# Patient Record
Sex: FEMALE | Race: WHITE | NOT HISPANIC OR LATINO
[De-identification: names, ages, dates, MRNs, and addresses within clinical notes are randomized per-mention and may not be internally consistent; named-entity substitution may affect disease eponyms.]

---

## 2023-11-10 ENCOUNTER — APPOINTMENT (OUTPATIENT)
Dept: ANTEPARTUM | Facility: CLINIC | Age: 33
End: 2023-11-10
Payer: COMMERCIAL

## 2023-11-10 ENCOUNTER — ASOB RESULT (OUTPATIENT)
Age: 33
End: 2023-11-10

## 2023-11-10 PROBLEM — Z00.00 ENCOUNTER FOR PREVENTIVE HEALTH EXAMINATION: Status: ACTIVE | Noted: 2023-11-10

## 2023-11-10 PROCEDURE — 76813 OB US NUCHAL MEAS 1 GEST: CPT

## 2023-11-10 PROCEDURE — 76801 OB US < 14 WKS SINGLE FETUS: CPT | Mod: 59

## 2023-11-24 ENCOUNTER — APPOINTMENT (OUTPATIENT)
Dept: MATERNAL FETAL MEDICINE | Facility: CLINIC | Age: 33
End: 2023-11-24
Payer: COMMERCIAL

## 2023-11-24 ENCOUNTER — ASOB RESULT (OUTPATIENT)
Age: 33
End: 2023-11-24

## 2023-11-24 PROCEDURE — 99203 OFFICE O/P NEW LOW 30 MIN: CPT | Mod: 95

## 2023-12-06 ENCOUNTER — ASOB RESULT (OUTPATIENT)
Age: 33
End: 2023-12-06

## 2023-12-06 ENCOUNTER — APPOINTMENT (OUTPATIENT)
Dept: ANTEPARTUM | Facility: CLINIC | Age: 33
End: 2023-12-06
Payer: COMMERCIAL

## 2023-12-06 PROCEDURE — 76805 OB US >/= 14 WKS SNGL FETUS: CPT

## 2023-12-28 ENCOUNTER — ASOB RESULT (OUTPATIENT)
Age: 33
End: 2023-12-28

## 2023-12-28 ENCOUNTER — APPOINTMENT (OUTPATIENT)
Dept: ANTEPARTUM | Facility: CLINIC | Age: 33
End: 2023-12-28
Payer: COMMERCIAL

## 2023-12-28 PROCEDURE — 76816 OB US FOLLOW-UP PER FETUS: CPT

## 2024-01-28 ENCOUNTER — EMERGENCY (EMERGENCY)
Facility: HOSPITAL | Age: 34
LOS: 0 days | Discharge: HOME | End: 2024-01-28
Attending: EMERGENCY MEDICINE
Payer: COMMERCIAL

## 2024-01-28 ENCOUNTER — OUTPATIENT (OUTPATIENT)
Dept: INPATIENT UNIT | Facility: HOSPITAL | Age: 34
LOS: 1 days | Discharge: HOME | End: 2024-01-28
Payer: COMMERCIAL

## 2024-01-28 ENCOUNTER — RESULT REVIEW (OUTPATIENT)
Age: 34
End: 2024-01-28

## 2024-01-28 VITALS
SYSTOLIC BLOOD PRESSURE: 101 MMHG | TEMPERATURE: 99 F | OXYGEN SATURATION: 97 % | RESPIRATION RATE: 19 BRPM | DIASTOLIC BLOOD PRESSURE: 66 MMHG

## 2024-01-28 VITALS
DIASTOLIC BLOOD PRESSURE: 67 MMHG | SYSTOLIC BLOOD PRESSURE: 117 MMHG | OXYGEN SATURATION: 100 % | TEMPERATURE: 98 F | HEART RATE: 97 BPM | RESPIRATION RATE: 16 BRPM

## 2024-01-28 DIAGNOSIS — O26.899 OTHER SPECIFIED PREGNANCY RELATED CONDITIONS, UNSPECIFIED TRIMESTER: ICD-10-CM

## 2024-01-28 DIAGNOSIS — M25.562 PAIN IN LEFT KNEE: ICD-10-CM
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
## 2024-01-28 DIAGNOSIS — O26.892 OTHER SPECIFIED PREGNANCY RELATED CONDITIONS, SECOND TRIMESTER: ICD-10-CM
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
## 2024-01-28 DIAGNOSIS — Z3A.24 24 WEEKS GESTATION OF PREGNANCY: ICD-10-CM
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
## 2024-01-28 LAB
ABO RH CONFIRMATION: SIGNIFICANT CHANGE UP
APTT BLD: 29.5 SEC — SIGNIFICANT CHANGE UP (ref 24.5–35.6)
BASOPHILS # BLD AUTO: 0.04 K/UL — SIGNIFICANT CHANGE UP (ref 0–0.2)
BASOPHILS NFR BLD AUTO: 0.4 % — SIGNIFICANT CHANGE UP (ref 0–2)
EOSINOPHIL # BLD AUTO: 0.18 K/UL — SIGNIFICANT CHANGE UP (ref 0–0.5)
EOSINOPHIL NFR BLD AUTO: 1.7 % — SIGNIFICANT CHANGE UP (ref 0–6)
FIBRINOGEN PPP-MCNC: 459 MG/DL — HIGH (ref 200–435)
HCT VFR BLD CALC: 35.1 % — SIGNIFICANT CHANGE UP (ref 34.5–45)
HGB BLD-MCNC: 11.6 G/DL — SIGNIFICANT CHANGE UP (ref 11.5–15.5)
IMM GRANULOCYTES NFR BLD AUTO: 0.4 % — SIGNIFICANT CHANGE UP (ref 0–0.9)
INR BLD: 0.89 RATIO — SIGNIFICANT CHANGE UP (ref 0.85–1.18)
LYMPHOCYTES # BLD AUTO: 1.74 K/UL — SIGNIFICANT CHANGE UP (ref 1–3.3)
LYMPHOCYTES # BLD AUTO: 16.9 % — SIGNIFICANT CHANGE UP (ref 13–44)
MCHC RBC-ENTMCNC: 29.3 PG — SIGNIFICANT CHANGE UP (ref 27–34)
MCHC RBC-ENTMCNC: 33 GM/DL — SIGNIFICANT CHANGE UP (ref 32–36)
MCV RBC AUTO: 88.6 FL — SIGNIFICANT CHANGE UP (ref 80–100)
MONOCYTES # BLD AUTO: 0.65 K/UL — SIGNIFICANT CHANGE UP (ref 0–0.9)
MONOCYTES NFR BLD AUTO: 6.3 % — SIGNIFICANT CHANGE UP (ref 2–14)
NEUTROPHILS # BLD AUTO: 7.65 K/UL — HIGH (ref 1.8–7.4)
NEUTROPHILS NFR BLD AUTO: 74.3 % — SIGNIFICANT CHANGE UP (ref 43–77)
PLATELET # BLD AUTO: 208 K/UL — SIGNIFICANT CHANGE UP (ref 150–400)
PROTHROM AB SERPL-ACNC: 10.1 SEC — SIGNIFICANT CHANGE UP (ref 9.5–13)
RBC # BLD: 3.96 M/UL — SIGNIFICANT CHANGE UP (ref 3.8–5.2)
RBC # FLD: 13.7 % — SIGNIFICANT CHANGE UP (ref 10.3–14.5)
WBC # BLD: 10.3 K/UL — SIGNIFICANT CHANGE UP (ref 3.8–10.5)
WBC # FLD AUTO: 10.3 K/UL — SIGNIFICANT CHANGE UP (ref 3.8–10.5)

## 2024-01-28 PROCEDURE — 86850 RBC ANTIBODY SCREEN: CPT

## 2024-01-28 PROCEDURE — 76815 OB US LIMITED FETUS(S): CPT

## 2024-01-28 PROCEDURE — 99283 EMERGENCY DEPT VISIT LOW MDM: CPT

## 2024-01-28 PROCEDURE — 36415 COLL VENOUS BLD VENIPUNCTURE: CPT

## 2024-01-28 PROCEDURE — 86901 BLOOD TYPING SEROLOGIC RH(D): CPT

## 2024-01-28 PROCEDURE — 99282 EMERGENCY DEPT VISIT SF MDM: CPT

## 2024-01-28 PROCEDURE — 85384 FIBRINOGEN ACTIVITY: CPT

## 2024-01-28 PROCEDURE — 99214 OFFICE O/P EST MOD 30 MIN: CPT

## 2024-01-28 PROCEDURE — 85025 COMPLETE CBC W/AUTO DIFF WBC: CPT

## 2024-01-28 PROCEDURE — 99212 OFFICE O/P EST SF 10 MIN: CPT

## 2024-01-28 PROCEDURE — 85730 THROMBOPLASTIN TIME PARTIAL: CPT

## 2024-01-28 PROCEDURE — 76815 OB US LIMITED FETUS(S): CPT | Mod: 26

## 2024-01-28 PROCEDURE — 86900 BLOOD TYPING SEROLOGIC ABO: CPT

## 2024-01-28 PROCEDURE — 85610 PROTHROMBIN TIME: CPT

## 2024-01-28 NOTE — ED STATDOCS - CLINICAL SUMMARY MEDICAL DECISION MAKING FREE TEXT BOX
In summary this 33-year-old female who presents status post fall, she is 24 weeks pregnant.  Patient reported that she had left knee pain prior to arrival.  On exam she has no reproducible tenderness, is ambulatory, limb is neurovascular intact.  She has no other signs of trauma on her body.  No need for further evaluation in the ED.  Discussed with L&D patient to be discharged to L&D for NST.

## 2024-01-28 NOTE — OB PROVIDER TRIAGE NOTE - ATTENDING COMMENTS
34yo  @ 24w 1d presented to labor and delivery after fall on  lower stairs @ 3.30 pm today, positive fetal movements, no abdominal pain or tenderness, no vaginal bleeding or leakage of fluids.   US wnl  FHR appropriate for gestational age.  discussed following up with primary OB in 24- 48 hrs, To nearest labor room if symptomatic.  Instructions, note to work given, pelvic rest advised.  Resident note reviewed and agreed

## 2024-01-28 NOTE — ED STATDOCS - NSFOLLOWUPINSTRUCTIONS_ED_ALL_ED_FT
Proceed to Labor and Delivery Unit.     Follow up with OBGYN once discharged. Take Tylenol 650-1000 mg every 6 hours as needed for pain. Do not exceed 4,000 mg in a 24 hour period. Return to ED for new or worsening symptoms.    Acute Knee Pain, Adult  Acute knee pain is sudden and may be caused by damage, swelling, or irritation of the muscles and tissues that support the knee. Pain may result from:  A fall.  An injury to the knee from twisting motions.  A hit to the knee.  Infection.  Acute knee pain may go away on its own with time and rest. If it does not, your health care provider may order tests to find the cause of the pain. These may include:  Imaging tests, such as an X-ray, MRI, CT scan, or ultrasound.  Joint aspiration. In this test, fluid is removed from the knee and evaluated.  Arthroscopy. In this test, a lighted tube is inserted into the knee and an image is projected onto a TV screen.  Biopsy. In this test, a sample of tissue is removed from the body and studied under a microscope.  Follow these instructions at home:  If you have a knee sleeve or brace:      Wear the knee sleeve or brace as told by your health care provider. Remove it only as told by your health care provider.  Loosen it if your toes tingle, become numb, or turn cold and blue.  Keep it clean.  If the knee sleeve or brace is not waterproof:  Do not let it get wet.  Cover it with a watertight covering when you take a bath or shower.  Activity    Rest your knee.  Do not do things that cause pain or make pain worse.  Avoid high-impact activities or exercises, such as running, jumping rope, or doing jumping jacks.  Work with a physical therapist to make a safe exercise program, as recommended by your health care provider. Do exercises as told by your physical therapist.  Managing pain, stiffness, and swelling      If directed, put ice on the affected knee. To do this:  If you have a removable knee sleeve or brace, remove it as told by your health care provider.  Put ice in a plastic bag.  Place a towel between your skin and the bag.  Leave the ice on for 20 minutes, 2–3 times a day.  Remove the ice if your skin turns bright red. This is very important. If you cannot feel pain, heat, or cold, you have a greater risk of damage to the area.  If directed, use an elastic bandage to put pressure (compression) on your injured knee. This may control swelling, give support, and help with discomfort.  Raise (elevate) your knee above the level of your heart while you are sitting or lying down.  Sleep with a pillow under your knee.  General instructions    Take over-the-counter and prescription medicines only as told by your health care provider.  Do not use any products that contain nicotine or tobacco, such as cigarettes, e-cigarettes, and chewing tobacco. If you need help quitting, ask your health care provider.  If you are overweight, work with your health care provider and a dietitian to set a weight-loss goal that is healthy and reasonable for you. Extra weight can put pressure on your knee.  Pay attention to any changes in your symptoms.  Keep all follow-up visits. This is important.  Contact a health care provider if:  Your knee pain continues, changes, or gets worse.  You have a fever along with knee pain.  Your knee feels warm to the touch or is red.  Your knee jose alberto or locks up.  Get help right away if:  Your knee swells, and the swelling becomes worse.  You cannot move your knee.  You have severe pain in your knee that cannot be managed with pain medicine.  Summary  Acute knee pain can be caused by a fall, an injury, an infection, or damage, swelling, or irritation of the tissues that support your knee.  Your health care provider may perform tests to find out the cause of the pain.  Pay attention to any changes in your symptoms. Relieve your pain with rest, medicines, light activity, and the use of ice.  Get help right away if your knee swells, you cannot move your knee, or you have severe pain that cannot be managed with medicine.  This information is not intended to replace advice given to you by your health care provider. Make sure you discuss any questions you have with your health care provider.

## 2024-01-28 NOTE — OB PROVIDER TRIAGE NOTE - NSHPLABSRESULTS_GEN_ALL_CORE
< from: US OB Position Fetus(es) (01.28.24 @ 19:45) >  FINDINGS:    Single live Intrauterine gestation is present.  Fetal position: CEPHALIC presentation.  Placenta location: ANT . Normal in appearance.  Amniotic fluid volume: Normal.  Cervical length: Not well visualized.    Fetal motion is seen in real-time and the fetal heart rate measures 163   bpm bpm.    Fetal anatomy and umbilical cord were not evaluated.    Measurements are as follows:    BPD: 6.1 cm  corresponding to 24 weeks, 6 days.  HC: 22.4 cm corresponding to 24 weeks, 3 days.  AC: 20.1 cm corresponding to 24 weeks, 5 days.  FL: 4.5 cm corresponding to 24 weeks, 5 days.    Estimated fetal weight: 429 g,   1 lb and 10 ounces    Estimated fetal weight percent: 65%.    Additional comments: None.    IMPRESSION:  Single live intrauterine gestation.    Please note that this study was not optimized for the evaluation of fetal  anatomy and umbilical cord which should be performed on a separate basis   as clinically indicated.    --- End of Report ---    < end of copied text >

## 2024-01-28 NOTE — ED STATDOCS - OBJECTIVE STATEMENT
32 y/o female GP approximately 24 weeks pregnant presents to the ED s/p trip and fall landing on her right knee, pt with mild knee pain however able to ambulate without difficulty. Pt does not believe she hit her abdomen but was advised by her OB to come and be evaluate. Pt denies vaginal bleeding, abdominal pain, N/V, hematuria.

## 2024-01-28 NOTE — ED STATDOCS - PROGRESS NOTE DETAILS
33 year old female approx 24 weeks gestation presents to ED c/o left knee pain s/p mechanical fall. Parish head trauma or fall onto abdomen. Spoke to her OB, advised to come to ED for further evaluation. No abdominal cramping, vaginal bleeding, ext numbness/weakness/tingling. Vitals are stable on arrival. PE demonstrates gravid abdomen, no ttp, left knee no ttp, full ROM, NVI, steady gait. Spoke to L&D, recommends dc from ED and to send her up to L&D floor for NST. Pt aware and agrees to plan. - Ember Lora PA-C

## 2024-01-28 NOTE — ED ADULT TRIAGE NOTE - CHIEF COMPLAINT QUOTE
Pt is 24wks pregnant, fell down approx. 7 steps. States "I was walking down the stairs with my son, his foot got stuck in the railing I tripped over him a bit and hit my left knee and landed on the right side. I don't think I hit my stomach but when I told my doctor she told me to get checked out. I still feel my baby moving." Denies vaginal bleeding, abdominal pain, knee pain. L&D Luis called and states to register patient in ER and then they will evaluate patient.

## 2024-01-28 NOTE — OB PROVIDER TRIAGE NOTE - HISTORY OF PRESENT ILLNESS
HANNAH QUEZADA is a 32yo  @ 24w1d by LMP 23 JEY 24 presenting after fall down 6 stairs. Patient reports tripping over her son before falling down the steps, landing first onto her left knee then onto her right side. Denies direct abdominal strike. Denies abdominal cramping, vaginal bleeding, LOF. Reports active FM.    PNC   1. AMA, LR NIPs  2. Prior CSx1    OBhx:   G1:  pCS 2/2 arrest of dilation  Gyn: +fibroid uterus  PMH: denies  PSH: CSx1, T&A  Med: PNV, ASA  Allergies: NKDA

## 2024-01-28 NOTE — OB RN TRIAGE NOTE - CURRENT PREGNANCY COMPLICATIONS, OB PROFILE
Patient currently being seen in [facility]    Reason for Disposition  • Patient already left for the hospital/clinic.    Protocols used: NO CONTACT OR DUPLICATE CONTACT CALL-A-AH       None

## 2024-01-28 NOTE — OB PROVIDER TRIAGE NOTE - NSHPPHYSICALEXAM_GEN_ALL_CORE
Vitals:   T(C): 36.7 (01-28-24 @ 18:07), Max: 37.1 (01-28-24 @ 16:59)  T(F): 98 (01-28-24 @ 18:07), Max: 98.8 (01-28-24 @ 16:59)  HR: 97 (01-28-24 @ 18:07) (97 - 97)  BP: 117/67 (01-28-24 @ 18:07) (101/66 - 117/67)  RR: 16 (01-28-24 @ 18:07) (16 - 19)  SpO2: 100% (01-28-24 @ 18:07) (97% - 100%)    General: AAOx3, NAD  Abd: Soft, nontender, gravid  SVE: deferred    FHT: reactive  [place]:  not deepali

## 2024-01-28 NOTE — OB PROVIDER TRIAGE NOTE - NSOBPROVIDERNOTE_OBGYN_ALL_OB_FT
A/P: HANNAH QUEZADA is a 34yo  @ 24w1d by LMP 23 JEY 24 presenting after she fell onto knee and right side after fall down 6 stairs.     - (+) FM, denies CTXs, denies VB  - NST reactive and reassuring for gestational age  - US wnl with normal placenta  - To follow up outpatient with provider within the week  - Return precautions given    D/w Dr. Hawthorne

## 2024-01-28 NOTE — ED STATDOCS - PATIENT PORTAL LINK FT
You can access the FollowMyHealth Patient Portal offered by [facility] by registering at the following website: [email or web address]. By joining [employer]’s FollowMyHealth portal, you will also be able to view your health information using other applications (apps) compatible with our system.

## 2024-01-31 DIAGNOSIS — O34.219 MATERNAL CARE FOR UNSPECIFIED TYPE SCAR FROM PREVIOUS CESAREAN DELIVERY: ICD-10-CM

## 2024-01-31 DIAGNOSIS — Z3A.24 24 WEEKS GESTATION OF PREGNANCY: ICD-10-CM

## 2024-01-31 DIAGNOSIS — O09.522 SUPERVISION OF ELDERLY MULTIGRAVIDA, SECOND TRIMESTER: ICD-10-CM

## 2024-01-31 DIAGNOSIS — Z04.3 ENCOUNTER FOR EXAMINATION AND OBSERVATION FOLLOWING OTHER ACCIDENT: ICD-10-CM

## 2024-01-31 DIAGNOSIS — D25.9 LEIOMYOMA OF UTERUS, UNSPECIFIED: ICD-10-CM

## 2024-01-31 DIAGNOSIS — O34.12 MATERNAL CARE FOR BENIGN TUMOR OF CORPUS UTERI, SECOND TRIMESTER: ICD-10-CM

## 2024-04-23 ENCOUNTER — NON-APPOINTMENT (OUTPATIENT)
Age: 34
End: 2024-04-23

## 2024-04-24 ENCOUNTER — NON-APPOINTMENT (OUTPATIENT)
Age: 34
End: 2024-04-24

## 2024-04-29 ENCOUNTER — APPOINTMENT (OUTPATIENT)
Dept: OBGYN | Facility: CLINIC | Age: 34
End: 2024-04-29

## 2024-05-09 ENCOUNTER — OUTPATIENT (OUTPATIENT)
Dept: OUTPATIENT SERVICES | Facility: HOSPITAL | Age: 34
LOS: 1 days | End: 2024-05-09

## 2024-05-09 VITALS
HEART RATE: 71 BPM | HEIGHT: 62 IN | DIASTOLIC BLOOD PRESSURE: 72 MMHG | SYSTOLIC BLOOD PRESSURE: 106 MMHG | OXYGEN SATURATION: 99 % | TEMPERATURE: 98 F | RESPIRATION RATE: 18 BRPM | WEIGHT: 220.9 LBS

## 2024-05-09 DIAGNOSIS — Z98.891 HISTORY OF UTERINE SCAR FROM PREVIOUS SURGERY: Chronic | ICD-10-CM

## 2024-05-09 LAB
APPEARANCE UR: ABNORMAL
BACTERIA # UR AUTO: ABNORMAL /HPF
BILIRUB UR-MCNC: NEGATIVE — SIGNIFICANT CHANGE UP
BLD GP AB SCN SERPL QL: NEGATIVE — SIGNIFICANT CHANGE UP
CAST: 0 /LPF — SIGNIFICANT CHANGE UP (ref 0–4)
COLOR SPEC: YELLOW — SIGNIFICANT CHANGE UP
DIFF PNL FLD: NEGATIVE — SIGNIFICANT CHANGE UP
GLUCOSE UR QL: 100 MG/DL
HCT VFR BLD CALC: 36.3 % — SIGNIFICANT CHANGE UP (ref 34.5–45)
HGB BLD-MCNC: 11.8 G/DL — SIGNIFICANT CHANGE UP (ref 11.5–15.5)
KETONES UR-MCNC: NEGATIVE MG/DL — SIGNIFICANT CHANGE UP
LEUKOCYTE ESTERASE UR-ACNC: NEGATIVE — SIGNIFICANT CHANGE UP
MCHC RBC-ENTMCNC: 28.7 PG — SIGNIFICANT CHANGE UP (ref 27–34)
MCHC RBC-ENTMCNC: 32.5 GM/DL — SIGNIFICANT CHANGE UP (ref 32–36)
MCV RBC AUTO: 88.3 FL — SIGNIFICANT CHANGE UP (ref 80–100)
NITRITE UR-MCNC: NEGATIVE — SIGNIFICANT CHANGE UP
NRBC # BLD: 0 /100 WBCS — SIGNIFICANT CHANGE UP (ref 0–0)
NRBC # FLD: 0 K/UL — SIGNIFICANT CHANGE UP (ref 0–0)
PH UR: 6 — SIGNIFICANT CHANGE UP (ref 5–8)
PLATELET # BLD AUTO: 176 K/UL — SIGNIFICANT CHANGE UP (ref 150–400)
PROT UR-MCNC: SIGNIFICANT CHANGE UP MG/DL
RBC # BLD: 4.11 M/UL — SIGNIFICANT CHANGE UP (ref 3.8–5.2)
RBC # FLD: 14.6 % — HIGH (ref 10.3–14.5)
RBC CASTS # UR COMP ASSIST: 0 /HPF — SIGNIFICANT CHANGE UP (ref 0–4)
RH IG SCN BLD-IMP: POSITIVE — SIGNIFICANT CHANGE UP
SP GR SPEC: 1.03 — SIGNIFICANT CHANGE UP (ref 1–1.03)
SQUAMOUS # UR AUTO: 24 /HPF — HIGH (ref 0–5)
UROBILINOGEN FLD QL: 0.2 MG/DL — SIGNIFICANT CHANGE UP (ref 0.2–1)
WBC # BLD: 7.37 K/UL — SIGNIFICANT CHANGE UP (ref 3.8–10.5)
WBC # FLD AUTO: 7.37 K/UL — SIGNIFICANT CHANGE UP (ref 3.8–10.5)
WBC UR QL: 5 /HPF — SIGNIFICANT CHANGE UP (ref 0–5)

## 2024-05-09 NOTE — OB PST NOTE - IN ACCORDANCE WITH [place] STATE LAW, WE OFFER EVERY PATIENT A HEPATITIS C TEST. WOULD YOU LIKE TO BE TESTED TODAY?
Nurses Note -- 4 Eyes      10/6/2022   3:04 AM      Skin assessed during: Admit      [x] No Pressure Injuries Present    []Prevention Measures Documented      [] Yes- Altered Skin Integrity Present or Discovered   [] LDA Added if Not in Epic (Describe Wound)   [] New Altered Skin Integrity was Present on Admit and Documented in LDA   [] Wound Image Taken    Wound Care Consulted? No    Attending Nurse:  Satnam Sosa RN     Second RN/Staff Member:  Sienna Lai RN      Opt out

## 2024-05-09 NOTE — OB PST NOTE - PROBLEM SELECTOR PLAN 1
Patient is tentatively for  Section on 24. Pre-op intructions provided to patient. Patient given verbal and written instructions on Chlorhexadine soap. Patient verbalized understanding. Patient instructed to obtain all medication intructions from OB and able to verbalize understanding.    CBC/UA/T&S sent at PST

## 2024-05-09 NOTE — OB PST NOTE - HISTORY OF PRESENT ILLNESS
34 year old pregnant female presents to presurgical testing scheduled for repeat Caesarean section. Patient denies vaginal  bleeding, spotting or leakage of amniotic fluid. Patient denies regular contractions. Patient reports positive fetal movement. Patient scheduled for repeat  on 24.                                                            2cm left ovarian simple cyst  Abnormal NT BW & DS, declines Amnio  Placenta extending to STEVEN per Anatomy Sono WNL 24

## 2024-05-09 NOTE — OB PST NOTE - NSHPPHYSICALEXAM_GEN_ALL_CORE
Constitutional: well developed, well groomed, well nourished, no distress    Eyes: PERRL, EOMI, conjunctiva clear    Ears: normal    Mouth and Gums: normal, moist    Pharynx: no tenderness, discharge, or peritonsillar abscess    Tonsils: no redness, discharge, tenderness, or swelling    Neck: supple    Breast: normal shape    Back: normal shape, ROM intact, strength intact, no vertebral tenderness    Respiratory: airway patent, breast sounds equal, good air movement, respiration non-labored, clear to auscultation bilaterally    Cardiovascular: regular rate and rhythm, no rubs, murmur, normal PMI    Gastrointestinal: gravid abdomen, non tender, normal bowel sounds    Extremities: no clubbing, cyanosis     Vascular: carotid pulse normal, radial pulse normal, DP pulse normal, PT pulse normal    Neurological: alert and oriented x3, sensation intact, normal strength    Skin: warm and dry, normal color    Lymph nodes: no anterior cervical lymphadenopathy    Musculoskeletal: ROM intact, normal strength, no joint swelling, warmth, or calf tenderness    Psychiatric: normal affect, normal behavior

## 2024-05-13 ENCOUNTER — TRANSCRIPTION ENCOUNTER (OUTPATIENT)
Age: 34
End: 2024-05-13

## 2024-05-14 ENCOUNTER — INPATIENT (INPATIENT)
Facility: HOSPITAL | Age: 34
LOS: 1 days | Discharge: HOME | End: 2024-05-16
Attending: OBSTETRICS & GYNECOLOGY | Admitting: OBSTETRICS & GYNECOLOGY

## 2024-05-14 ENCOUNTER — TRANSCRIPTION ENCOUNTER (OUTPATIENT)
Age: 34
End: 2024-05-14

## 2024-05-14 VITALS — SYSTOLIC BLOOD PRESSURE: 99 MMHG | DIASTOLIC BLOOD PRESSURE: 62 MMHG | HEART RATE: 86 BPM | TEMPERATURE: 98 F

## 2024-05-14 DIAGNOSIS — Z98.891 HISTORY OF UTERINE SCAR FROM PREVIOUS SURGERY: Chronic | ICD-10-CM

## 2024-05-14 LAB
BASOPHILS # BLD AUTO: 0.06 K/UL — SIGNIFICANT CHANGE UP (ref 0–0.2)
BASOPHILS NFR BLD AUTO: 0.7 % — SIGNIFICANT CHANGE UP (ref 0–2)
BLD GP AB SCN SERPL QL: NEGATIVE — SIGNIFICANT CHANGE UP
EOSINOPHIL # BLD AUTO: 0.25 K/UL — SIGNIFICANT CHANGE UP (ref 0–0.5)
EOSINOPHIL NFR BLD AUTO: 3.1 % — SIGNIFICANT CHANGE UP (ref 0–6)
HCT VFR BLD CALC: 40.9 % — SIGNIFICANT CHANGE UP (ref 34.5–45)
HGB BLD-MCNC: 13.1 G/DL — SIGNIFICANT CHANGE UP (ref 11.5–15.5)
IANC: 5.09 K/UL — SIGNIFICANT CHANGE UP (ref 1.8–7.4)
IMM GRANULOCYTES NFR BLD AUTO: 0.4 % — SIGNIFICANT CHANGE UP (ref 0–0.9)
LYMPHOCYTES # BLD AUTO: 2.19 K/UL — SIGNIFICANT CHANGE UP (ref 1–3.3)
LYMPHOCYTES # BLD AUTO: 27.1 % — SIGNIFICANT CHANGE UP (ref 13–44)
MCHC RBC-ENTMCNC: 28.6 PG — SIGNIFICANT CHANGE UP (ref 27–34)
MCHC RBC-ENTMCNC: 32 GM/DL — SIGNIFICANT CHANGE UP (ref 32–36)
MCV RBC AUTO: 89.3 FL — SIGNIFICANT CHANGE UP (ref 80–100)
MONOCYTES # BLD AUTO: 0.45 K/UL — SIGNIFICANT CHANGE UP (ref 0–0.9)
MONOCYTES NFR BLD AUTO: 5.6 % — SIGNIFICANT CHANGE UP (ref 2–14)
NEUTROPHILS # BLD AUTO: 5.09 K/UL — SIGNIFICANT CHANGE UP (ref 1.8–7.4)
NEUTROPHILS NFR BLD AUTO: 63.1 % — SIGNIFICANT CHANGE UP (ref 43–77)
NRBC # BLD: 0 /100 WBCS — SIGNIFICANT CHANGE UP (ref 0–0)
NRBC # FLD: 0 K/UL — SIGNIFICANT CHANGE UP (ref 0–0)
PLATELET # BLD AUTO: 187 K/UL — SIGNIFICANT CHANGE UP (ref 150–400)
RBC # BLD: 4.58 M/UL — SIGNIFICANT CHANGE UP (ref 3.8–5.2)
RBC # FLD: 14.6 % — HIGH (ref 10.3–14.5)
RH IG SCN BLD-IMP: POSITIVE — SIGNIFICANT CHANGE UP
T PALLIDUM AB TITR SER: NEGATIVE — SIGNIFICANT CHANGE UP
WBC # BLD: 8.07 K/UL — SIGNIFICANT CHANGE UP (ref 3.8–10.5)
WBC # FLD AUTO: 8.07 K/UL — SIGNIFICANT CHANGE UP (ref 3.8–10.5)

## 2024-05-14 DEVICE — SURGICEL SNOW 2 X 4": Type: IMPLANTABLE DEVICE | Status: FUNCTIONAL

## 2024-05-14 RX ORDER — IBUPROFEN 200 MG
1 TABLET ORAL
Qty: 0 | Refills: 0 | DISCHARGE
Start: 2024-05-14

## 2024-05-14 RX ORDER — SODIUM CHLORIDE 9 MG/ML
1000 INJECTION, SOLUTION INTRAVENOUS
Refills: 0 | Status: DISCONTINUED | OUTPATIENT
Start: 2024-05-14 | End: 2024-05-14

## 2024-05-14 RX ORDER — SODIUM CHLORIDE 9 MG/ML
1000 INJECTION, SOLUTION INTRAVENOUS
Refills: 0 | Status: DISCONTINUED | OUTPATIENT
Start: 2024-05-14 | End: 2024-05-16

## 2024-05-14 RX ORDER — FAMOTIDINE 10 MG/ML
20 INJECTION INTRAVENOUS ONCE
Refills: 0 | Status: COMPLETED | OUTPATIENT
Start: 2024-05-14 | End: 2024-05-14

## 2024-05-14 RX ORDER — DIPHENHYDRAMINE HCL 50 MG
25 CAPSULE ORAL EVERY 6 HOURS
Refills: 0 | Status: DISCONTINUED | OUTPATIENT
Start: 2024-05-14 | End: 2024-05-16

## 2024-05-14 RX ORDER — ACETAMINOPHEN 500 MG
3 TABLET ORAL
Qty: 0 | Refills: 0 | DISCHARGE
Start: 2024-05-14

## 2024-05-14 RX ORDER — OXYCODONE HYDROCHLORIDE 5 MG/1
5 TABLET ORAL
Refills: 0 | Status: DISCONTINUED | OUTPATIENT
Start: 2024-05-14 | End: 2024-05-16

## 2024-05-14 RX ORDER — KETOROLAC TROMETHAMINE 30 MG/ML
30 SYRINGE (ML) INJECTION EVERY 6 HOURS
Refills: 0 | Status: DISCONTINUED | OUTPATIENT
Start: 2024-05-14 | End: 2024-05-15

## 2024-05-14 RX ORDER — MAGNESIUM HYDROXIDE 400 MG/1
30 TABLET, CHEWABLE ORAL
Refills: 0 | Status: DISCONTINUED | OUTPATIENT
Start: 2024-05-14 | End: 2024-05-16

## 2024-05-14 RX ORDER — LANOLIN
1 OINTMENT (GRAM) TOPICAL EVERY 6 HOURS
Refills: 0 | Status: DISCONTINUED | OUTPATIENT
Start: 2024-05-14 | End: 2024-05-16

## 2024-05-14 RX ORDER — ACETAMINOPHEN 500 MG
1000 TABLET ORAL ONCE
Refills: 0 | Status: COMPLETED | OUTPATIENT
Start: 2024-05-14 | End: 2024-05-14

## 2024-05-14 RX ORDER — ASPIRIN/CALCIUM CARB/MAGNESIUM 324 MG
2 TABLET ORAL
Refills: 0 | DISCHARGE

## 2024-05-14 RX ORDER — HEPARIN SODIUM 5000 [USP'U]/ML
10000 INJECTION INTRAVENOUS; SUBCUTANEOUS EVERY 12 HOURS
Refills: 0 | Status: DISCONTINUED | OUTPATIENT
Start: 2024-05-14 | End: 2024-05-16

## 2024-05-14 RX ORDER — OXYTOCIN 10 UNIT/ML
333.33 VIAL (ML) INJECTION
Qty: 20 | Refills: 0 | Status: DISCONTINUED | OUTPATIENT
Start: 2024-05-14 | End: 2024-05-16

## 2024-05-14 RX ORDER — TETANUS TOXOID, REDUCED DIPHTHERIA TOXOID AND ACELLULAR PERTUSSIS VACCINE, ADSORBED 5; 2.5; 8; 8; 2.5 [IU]/.5ML; [IU]/.5ML; UG/.5ML; UG/.5ML; UG/.5ML
0.5 SUSPENSION INTRAMUSCULAR ONCE
Refills: 0 | Status: DISCONTINUED | OUTPATIENT
Start: 2024-05-14 | End: 2024-05-16

## 2024-05-14 RX ORDER — OXYCODONE HYDROCHLORIDE 5 MG/1
5 TABLET ORAL ONCE
Refills: 0 | Status: DISCONTINUED | OUTPATIENT
Start: 2024-05-14 | End: 2024-05-16

## 2024-05-14 RX ORDER — IBUPROFEN 200 MG
600 TABLET ORAL EVERY 6 HOURS
Refills: 0 | Status: COMPLETED | OUTPATIENT
Start: 2024-05-14 | End: 2025-04-12

## 2024-05-14 RX ORDER — ACETAMINOPHEN 500 MG
975 TABLET ORAL
Refills: 0 | Status: DISCONTINUED | OUTPATIENT
Start: 2024-05-14 | End: 2024-05-16

## 2024-05-14 RX ORDER — CHLORHEXIDINE GLUCONATE 213 G/1000ML
1 SOLUTION TOPICAL DAILY
Refills: 0 | Status: DISCONTINUED | OUTPATIENT
Start: 2024-05-14 | End: 2024-05-14

## 2024-05-14 RX ORDER — SIMETHICONE 80 MG/1
80 TABLET, CHEWABLE ORAL EVERY 4 HOURS
Refills: 0 | Status: DISCONTINUED | OUTPATIENT
Start: 2024-05-14 | End: 2024-05-16

## 2024-05-14 RX ORDER — LANOLIN
1 OINTMENT (GRAM) TOPICAL
Qty: 0 | Refills: 0 | DISCHARGE
Start: 2024-05-14

## 2024-05-14 RX ORDER — CITRIC ACID/SODIUM CITRATE 300-500 MG
30 SOLUTION, ORAL ORAL ONCE
Refills: 0 | Status: COMPLETED | OUTPATIENT
Start: 2024-05-14 | End: 2024-05-14

## 2024-05-14 RX ADMIN — Medication 975 MILLIGRAM(S): at 21:53

## 2024-05-14 RX ADMIN — Medication 975 MILLIGRAM(S): at 21:23

## 2024-05-14 RX ADMIN — Medication 30 MILLILITER(S): at 09:16

## 2024-05-14 RX ADMIN — Medication 400 MILLIGRAM(S): at 14:56

## 2024-05-14 RX ADMIN — CHLORHEXIDINE GLUCONATE 1 APPLICATION(S): 213 SOLUTION TOPICAL at 09:16

## 2024-05-14 RX ADMIN — HEPARIN SODIUM 10000 UNIT(S): 5000 INJECTION INTRAVENOUS; SUBCUTANEOUS at 17:08

## 2024-05-14 RX ADMIN — Medication 30 MILLIGRAM(S): at 17:36

## 2024-05-14 RX ADMIN — FAMOTIDINE 20 MILLIGRAM(S): 10 INJECTION INTRAVENOUS at 09:24

## 2024-05-14 RX ADMIN — Medication 1000 MILLIUNIT(S)/MIN: at 14:57

## 2024-05-14 RX ADMIN — Medication 30 MILLIGRAM(S): at 17:08

## 2024-05-14 RX ADMIN — SODIUM CHLORIDE 200 MILLILITER(S): 9 INJECTION, SOLUTION INTRAVENOUS at 09:16

## 2024-05-14 RX ADMIN — SODIUM CHLORIDE 75 MILLILITER(S): 9 INJECTION, SOLUTION INTRAVENOUS at 14:57

## 2024-05-14 NOTE — OB PROVIDER H&P - NSHPPHYSICALEXAM_GEN_ALL_CORE
Physical Exam:  Vitals: ICU Vital Signs Last 24 Hrs  T(C): 36.9 (14 May 2024 09:32), Max: 36.9 (14 May 2024 09:32)  T(F): 98.4 (14 May 2024 09:32), Max: 98.4 (14 May 2024 09:32)  HR: 86 (14 May 2024 09:32) (86 - 86)  BP: 99/62 (14 May 2024 09:32) (99/62 - 99/62)  BP(mean): --  ABP: --  ABP(mean): --  RR: 15 (14 May 2024 09:32) (15 - 15)  SpO2: --      Gen: NAD, A+O x 3, resting comfortably  Resp: clear to ausculation bilaterally  Cardio: regular rate and rhythm   Abd: Gravid, soft, non-distended, non-tender to superficial and deep palpation in all 4 quadrants, no rebound/guarding  Ext: Warm, well perfused, 1+ nonpitting edema bilaterally    EFM: 135 bpm moderate variability with spontaneous accelerations, late deceleration  -maternal lateral repositioning   [place]: Irregular painless contractions

## 2024-05-14 NOTE — OB PROVIDER DELIVERY SUMMARY - NSPROVIDERDELIVERYNOTE_OBGYN_ALL_OB_FT
Procedure: rLTCS  Preop Dx: prior LTCS  QBL: 488 ml  IVF:  2L crystalloids  UOP: 400 ml  Layers of uterine closure: 1  Complications: none  Specimen: none   Findings: adhesions present on anterior uterus to anterior abdominal wall, grossly normal adnexa, uterus otherwise nl. Nuchal x1 reduced easily  Hemostatic/Intraoperative agents: Surgicel powder  Baby: F Apgars 9/9,  3543g    Sonia Moser MD  OBGYN PGY1 Procedure: rLTCS  Preop Dx: prior LTCS  QBL: 488 ml  IVF:  2L crystalloids  UOP: 400 ml  Layers of uterine closure: 1  Complications: none  Specimen: none   Findings: adhesions present on anterior uterus to anterior abdominal wall, grossly normal adnexa, uterus otherwise nl. Nuchal x1 reduced easily  Hemostatic/Intraoperative agents: Surgicel powder  Baby: F Apgars 9/9,  3543g    Dictation #: [id number]    Sonia Moser MD  OBGYN PGY1

## 2024-05-14 NOTE — OB PROVIDER H&P - NS_OBGYNHISTORY_OBGYN_ALL_OB_FT
OB History: : 2022 primary  for failed IOL, with IOL for PROM, FT, Male, 7 lb 5 oz, uncomplicated as per patient, delivered at [facility]  G2: Current pregnancy, AMA on baby ASA      - Abnormal NT for risk of down syndrome, s/p genetic counseling, declined amniocentesis      - As per prenatal chart, states "placenta extends to STEVEN" but as per all ultrasounds in [facility]IE and in patient chart, no history of previa, anatomy sonogram within normal limits  Denies HTN/DM/Fetal issues    Prenatal Labs Reviewed:  T&S: O+  Rubella: Immune   Hep B: Neg  HIV: Neg  RPR: Neg  GCT: not found in chart  GBS: , was negative on 4/3     Ultrasounds:   10/05: dating sono @ 7 weeks  : Cephalic, anterior placenta with no previa,  g, anatomy within normal limits  : Vertex, anterior placenta, BPP 8/8, EFW 2878 g (42%ile)     GYN Hx: 2 cm small simple left ovarian cysts noted on prior ultrasounds, Denies fibroids, HSV/ STDs, abnormal pap smears

## 2024-05-14 NOTE — OB PROVIDER DELIVERY SUMMARY - NSSELHIDDEN_OBGYN_ALL_OB_FT
[NS_DeliveryAttending1_OBGYN_ALL_OB_FT:[id number]],[NS_DeliveryAssist1_OBGYN_ALL_OB_FT:[id number]=],[NS_DeliveryRN_OBGYN_ALL_OB_FT:[id number]=]

## 2024-05-14 NOTE — DISCHARGE NOTE OB - MEDICATION SUMMARY - MEDICATIONS TO TAKE
I will START or STAY ON the medications listed below when I get home from the hospital:    ibuprofen 600 mg oral tablet  -- 1 tab(s) by mouth every 6 hours  -- Indication: For pain    acetaminophen 325 mg oral tablet  -- 3 tab(s) by mouth every 8 hours as needed for  moderate pain  -- Indication: For pain    lanolin topical ointment  -- 1 Apply on skin to affected area every 6 hours As needed Sore Nipples  -- Indication: For sore nipples

## 2024-05-14 NOTE — OB RN DELIVERY SUMMARY - NSSELHIDDEN_OBGYN_ALL_OB_FT
[NS_DeliveryAttending1_OBGYN_ALL_OB_FT:[id number]],[NS_DeliveryAssist1_OBGYN_ALL_OB_FT:[id number]=],[NS_DeliveryRN_OBGYN_ALL_OB_FT:[id number]=]

## 2024-05-14 NOTE — OB PROVIDER DELIVERY SUMMARY - NS_BIRTHTRAUMADETAILSA_OBGYN_ALL_OB_FT
R head lac <1cm in length without active bleeding at 10 minutes of life, facial features otherwise wnl, moving facial structures appropriately and symmetrically, anterior fontanel open soft and flat, no cleft lip/palate, ears normal set, no ear pits or tags. no lesions in mouth/throat, nares clinically patent

## 2024-05-14 NOTE — DISCHARGE NOTE OB - INCREASED VAGINAL BLEEDING OR LARGE CLOTS (SATURATING A PAD AN HOUR)
[Appropriately responsive] : appropriately responsive [Soft] : soft [Non-tender] : non-tender [Non-distended] : non-distended [No Lesions] : no lesions [No Mass] : no mass Statement Selected [Examination Of The Breasts] : a normal appearance [No Masses] : no breast masses were palpable [Labia Majora] : normal [Labia Minora] : normal [Normal] : normal

## 2024-05-14 NOTE — OB RN PATIENT PROFILE - FALL HARM RISK - HARM RISK INTERVENTIONS

## 2024-05-14 NOTE — DISCHARGE NOTE OB - MEDICATION SUMMARY - MEDICATIONS TO STOP TAKING
I will STOP taking the medications listed below when I get home from the hospital:    aspirin 81 mg oral tablet  -- 2 tab(s) by mouth once a day

## 2024-05-14 NOTE — OB NEONATOLOGY/PEDIATRICIAN DELIVERY SUMMARY - NSPEDSNEONOTESA_OBGYN_ALL_OB_FT
Baby girl born at 39+3 wks via repeat CS to a 35 y/o  O+ blood type mother. No significant maternal or prenatal history. Peds was called because baby got a R head laceration during delivery, bleeding stopped after a few minutes. PNL nr/immune/-, Hep C NR, GBS - on 4/3, but . AROM at TOD with clear fluids. Baby emerged vigorous, crying, was w/d/s/s with APGARS of 9/9. Mom would like to bottle feed, and consents to Hep B. Highest maternal temp. was 36.9 C, EOS .03    Physical Exam:  Gen: NAD, +grimace  HEENT: R head lac, facial features otherwise wnl, moving facial structures, anterior fontanel open soft and flat, no cleft lip/palate, ears normal set, no ear pits or tags. no lesions in mouth/throat, nares clinically patent  Resp: no increased work of breathing, good air entry b/l, clear to auscultation bilaterally  Cardio: Normal S1/S2, regular rate and rhythm, no murmurs, rubs or gallops  Abd: soft, non tender, non distended, + bowel sounds, umbilical cord with 3 vessels  Neuro: +grasp/suck/jaun, normal tone  Extremities: negative hall and ortolani, moving all extremities, full range of motion x 4, no crepitus  Skin: pink, warm  Genitals: Normal female anatomy, Rene 1, anus patent Baby girl born at 39+3 wks via repeat CS to a 35 y/o  O+ blood type mother. No significant maternal or prenatal history. Peds was called because baby got a R head laceration during delivery, bleeding stopped after a few minutes. NICU attending present as well, agree to get plastics consult for possible suture. PNL nr/immune/-, Hep C NR, GBS - on 4/3, but . AROM at TOD with clear fluids. Baby emerged vigorous, crying, was w/d/s/s with APGARS of 9/9. Mom would like to bottle feed, and consents to Hep B. Highest maternal temp. was 36.9 C, EOS .03    Physical Exam:  Gen: NAD, +grimace  HEENT: R head lac, facial features otherwise wnl, moving facial structures, anterior fontanel open soft and flat, no cleft lip/palate, ears normal set, no ear pits or tags. no lesions in mouth/throat, nares clinically patent  Resp: no increased work of breathing, good air entry b/l, clear to auscultation bilaterally  Cardio: Normal S1/S2, regular rate and rhythm, no murmurs, rubs or gallops  Abd: soft, non tender, non distended, + bowel sounds, umbilical cord with 3 vessels  Neuro: +grasp/suck/jaun, normal tone  Extremities: negative hall and ortolani, moving all extremities, full range of motion x 4, no crepitus  Skin: pink, warm  Genitals: Normal female anatomy, Rene 1, anus patent Baby girl born at 39+3 wks via repeat CS to a 37 y/o  O+ blood type mother. No significant maternal or prenatal history reported. Peds was called to evaluate laceration to R forehead. Laceration present on R forehead, <1cm in length, well-approximated, hemostasis achieved with holding pressure. NICU attending evaluated patient in OR, recommended admission to [facility] with Plastic Surgery consult to discuss closure (suture versus dressing versus dermabond). PNL nr/immune/-, Hep C NR, GBS - on 4/3, but . AROM at TOD with clear fluids. Baby emerged vigorous, crying, was w/d/s/s with APGARS of 9/9. Mom would like to bottle feed, and consents to Hep B. Highest maternal temp. was 36.9 C, EOS .03    Physical Exam:  Gen: NAD, +grimace  HEENT: R head lac <1cm in length without active bleeding at end of evaluation, facial features otherwise wnl, moving facial structures appropriately and symmetrically, anterior fontanel open soft and flat, no cleft lip/palate, ears normal set, no ear pits or tags. no lesions in mouth/throat, nares clinically patent  Resp: no increased work of breathing, good air entry b/l, clear to auscultation bilaterally  Cardio: Normal S1/S2, regular rate and rhythm, no murmurs, rubs or gallops  Abd: soft, non tender, non distended, + bowel sounds, umbilical cord with 3 vessels  Neuro: +grasp/suck/jaun, normal tone  Extremities: negative hall and ortolani, moving all extremities, full range of motion x 4, no crepitus  Skin: pink, warm  Genitals: Normal female anatomy, Rene 1, anus patent

## 2024-05-14 NOTE — OB RN INTRAOPERATIVE NOTE - NSSELHIDDEN_OBGYN_ALL_OB_FT
[NS_DeliveryAttending1_OBGYN_ALL_OB_FT:[id number]],[NS_DeliveryAssist1_OBGYN_ALL_OB_FT:[id number]],[NS_DeliveryRN_OBGYN_ALL_OB_FT:[id number]=]

## 2024-05-14 NOTE — DISCHARGE NOTE OB - CARE PLAN
Principal Discharge DX:	Status post repeat low transverse  section  Assessment and plan of treatment:	Term pregnancy now delivered  Routine post op care   1

## 2024-05-14 NOTE — DISCHARGE NOTE OB - CARE PROVIDER_API CALL
Mini Calle  Obstetrics and Gynecology  [address] [place] [id number]  Phone: [phone]  Follow Up Time:

## 2024-05-14 NOTE — DISCHARGE NOTE OB - PATIENT PORTAL LINK FT
You can access the FollowMyHealth Patient Portal offered by [facility] by registering at the following website: [email or web address]. By joining [employer]’s FollowMyHealth portal, you will also be able to view your health information using other applications (apps) compatible with our system.

## 2024-05-14 NOTE — DISCHARGE NOTE OB - CARE PROVIDERS DIRECT ADDRESSES
01-Mar-2018 00:13
[email or web address]

## 2024-05-14 NOTE — OB PROVIDER H&P - HISTORY OF PRESENT ILLNESS
Prenatal Care: [facility] [address] [place]     34 year old  @ 39.4 weeks, JEY 2024 dated by LMP (2023) consistent with 1st Trimester US, presents to L&D for scheduled repeat  secondary to history of prior . Patient admits to normal fetal movement. Denies vaginal bleeding, leakage of fluid, painful contractions/lower abdominal cramping, fever/chills, shortness of breath,  chest pain, increased swelling, difficulty ambulating, loss of taste/smell, nausea/vomiting/diarrhea, rash, weakness, paresthesia, change in appetite, dizziness, lightheadedness, cough, nasal congestion, runny nose    Antepartum Course:  1. BMI 40  2. Abnormal NT s/p genetics counseling, declined amniocentesis  3. History of

## 2024-05-14 NOTE — OB PROVIDER H&P - HBSAG: DATE, OB PROFILE
Patient to triage with c/o generalized body aches. Patient denies any other symptoms. resps even and unlabored.
14-May-2024

## 2024-05-14 NOTE — OB PROVIDER H&P - ASSESSMENT
34 year old  @ 39.4 weeks, admitted to L&D for scheduled repeat  for history of prior , NonReactive NST, vital signs stable  - Admit to L&D  - NPO  - IV access, CBC/T&C/RPR  - Continuous EFM/toco   - Pepcid and Bicitra as per pre-op policy  - T&C 2 units PRBCs  - Anesthesia consult   - Consent to be discussed and obtained by Dr. Calle  Risks, benefits, alternatives, and possible complications have been discussed in detail with the patient in her native language. Pre-admission, admission, and post admission procedures and expectations were discussed in detail. All questions answered, all appropriate hospital consents were signed. Informed consent was obtained  - Discussed with Dr. Calle

## 2024-05-15 LAB
BASOPHILS # BLD AUTO: 0.03 K/UL — SIGNIFICANT CHANGE UP (ref 0–0.2)
BASOPHILS NFR BLD AUTO: 0.3 % — SIGNIFICANT CHANGE UP (ref 0–2)
EOSINOPHIL # BLD AUTO: 0.02 K/UL — SIGNIFICANT CHANGE UP (ref 0–0.5)
EOSINOPHIL NFR BLD AUTO: 0.2 % — SIGNIFICANT CHANGE UP (ref 0–6)
HCT VFR BLD CALC: 32.3 % — LOW (ref 34.5–45)
HGB BLD-MCNC: 10.8 G/DL — LOW (ref 11.5–15.5)
IANC: 8.25 K/UL — HIGH (ref 1.8–7.4)
IMM GRANULOCYTES NFR BLD AUTO: 0.5 % — SIGNIFICANT CHANGE UP (ref 0–0.9)
LYMPHOCYTES # BLD AUTO: 2.57 K/UL — SIGNIFICANT CHANGE UP (ref 1–3.3)
LYMPHOCYTES # BLD AUTO: 21.7 % — SIGNIFICANT CHANGE UP (ref 13–44)
MCHC RBC-ENTMCNC: 28.8 PG — SIGNIFICANT CHANGE UP (ref 27–34)
MCHC RBC-ENTMCNC: 33.4 GM/DL — SIGNIFICANT CHANGE UP (ref 32–36)
MCV RBC AUTO: 86.1 FL — SIGNIFICANT CHANGE UP (ref 80–100)
MONOCYTES # BLD AUTO: 0.94 K/UL — HIGH (ref 0–0.9)
MONOCYTES NFR BLD AUTO: 7.9 % — SIGNIFICANT CHANGE UP (ref 2–14)
NEUTROPHILS # BLD AUTO: 8.25 K/UL — HIGH (ref 1.8–7.4)
NEUTROPHILS NFR BLD AUTO: 69.4 % — SIGNIFICANT CHANGE UP (ref 43–77)
NRBC # BLD: 0 /100 WBCS — SIGNIFICANT CHANGE UP (ref 0–0)
NRBC # FLD: 0 K/UL — SIGNIFICANT CHANGE UP (ref 0–0)
PLATELET # BLD AUTO: 178 K/UL — SIGNIFICANT CHANGE UP (ref 150–400)
RBC # BLD: 3.75 M/UL — LOW (ref 3.8–5.2)
RBC # FLD: 14.8 % — HIGH (ref 10.3–14.5)
WBC # BLD: 11.87 K/UL — HIGH (ref 3.8–10.5)
WBC # FLD AUTO: 11.87 K/UL — HIGH (ref 3.8–10.5)

## 2024-05-15 RX ORDER — IBUPROFEN 200 MG
600 TABLET ORAL EVERY 6 HOURS
Refills: 0 | Status: DISCONTINUED | OUTPATIENT
Start: 2024-05-15 | End: 2024-05-16

## 2024-05-15 RX ADMIN — Medication 600 MILLIGRAM(S): at 17:48

## 2024-05-15 RX ADMIN — HEPARIN SODIUM 10000 UNIT(S): 5000 INJECTION INTRAVENOUS; SUBCUTANEOUS at 05:55

## 2024-05-15 RX ADMIN — HEPARIN SODIUM 10000 UNIT(S): 5000 INJECTION INTRAVENOUS; SUBCUTANEOUS at 17:51

## 2024-05-15 RX ADMIN — Medication 975 MILLIGRAM(S): at 21:41

## 2024-05-15 RX ADMIN — Medication 30 MILLIGRAM(S): at 12:30

## 2024-05-15 RX ADMIN — Medication 975 MILLIGRAM(S): at 02:22

## 2024-05-15 RX ADMIN — Medication 600 MILLIGRAM(S): at 18:28

## 2024-05-15 RX ADMIN — Medication 30 MILLIGRAM(S): at 00:57

## 2024-05-15 RX ADMIN — Medication 975 MILLIGRAM(S): at 02:52

## 2024-05-15 RX ADMIN — Medication 30 MILLIGRAM(S): at 06:25

## 2024-05-15 RX ADMIN — Medication 30 MILLIGRAM(S): at 00:27

## 2024-05-15 RX ADMIN — Medication 30 MILLIGRAM(S): at 11:39

## 2024-05-15 RX ADMIN — Medication 975 MILLIGRAM(S): at 15:43

## 2024-05-15 RX ADMIN — Medication 30 MILLIGRAM(S): at 05:55

## 2024-05-15 RX ADMIN — Medication 975 MILLIGRAM(S): at 21:11

## 2024-05-15 RX ADMIN — Medication 975 MILLIGRAM(S): at 16:14

## 2024-05-15 NOTE — PROGRESS NOTE ADULT - ASSESSMENT
POD #1, s/p RCS LTCS, QBL 488ml. Doing well post operatively.     Acute blood loss anemia #  -H/H 10.8/32.3  -stated on PO Iron daily  -encouraged iron rich foods  -continue to monitor    Her pain is well controlled.   She is tolerating a regular diet and passing flatus.   Denies N/V. Denies CP/SOB/lightheadedness/dizziness.   She is ambulating without difficulty.   Voiding spontaneously.    Patient is stable and doing well post-operatively.    - Continue regular diet.  - Increase ambulation.  - Continue motrin, tylenol, oxycodone PRN for pain control.   -Encourage breastfeeding.  -Incisional care and PO instructions reviewed.     Discharge planning    Discussed with MD Shantelle Whitman

## 2024-05-16 VITALS
OXYGEN SATURATION: 99 % | DIASTOLIC BLOOD PRESSURE: 76 MMHG | HEART RATE: 84 BPM | RESPIRATION RATE: 18 BRPM | SYSTOLIC BLOOD PRESSURE: 117 MMHG | TEMPERATURE: 98 F

## 2024-05-16 RX ORDER — FERROUS SULFATE 325(65) MG
325 TABLET ORAL DAILY
Refills: 0 | Status: DISCONTINUED | OUTPATIENT
Start: 2024-05-16 | End: 2024-05-16

## 2024-05-16 RX ADMIN — Medication 600 MILLIGRAM(S): at 00:14

## 2024-05-16 RX ADMIN — Medication 975 MILLIGRAM(S): at 09:05

## 2024-05-16 RX ADMIN — Medication 600 MILLIGRAM(S): at 00:44

## 2024-05-16 RX ADMIN — Medication 975 MILLIGRAM(S): at 03:29

## 2024-05-16 RX ADMIN — HEPARIN SODIUM 10000 UNIT(S): 5000 INJECTION INTRAVENOUS; SUBCUTANEOUS at 05:21

## 2024-05-16 RX ADMIN — Medication 975 MILLIGRAM(S): at 08:35

## 2024-05-16 RX ADMIN — Medication 600 MILLIGRAM(S): at 05:52

## 2024-05-16 RX ADMIN — Medication 600 MILLIGRAM(S): at 05:22

## 2024-05-16 RX ADMIN — Medication 975 MILLIGRAM(S): at 02:59

## 2024-05-16 RX ADMIN — MAGNESIUM HYDROXIDE 30 MILLILITER(S): 400 TABLET, CHEWABLE ORAL at 00:12

## 2024-05-16 NOTE — PROGRESS NOTE ADULT - ASSESSMENT
POD #2, s/p RCS LTCS, QBL 488ml. Anemia.  Doing well post operatively.     Acute blood loss anemia #  -H/H 10.8/32.3  -continue PO Iron daily  -encouraged iron rich foods  -continue to monitor    Her pain is well controlled.   She is tolerating a regular diet and passing flatus.   Denies N/V. Denies CP/SOB/lightheadedness/dizziness.   She is ambulating without difficulty.   Voiding spontaneously.    Patient is stable and doing well post-operatively.    - Continue regular diet.  - Increase ambulation.  - Continue motrin, tylenol, oxycodone PRN for pain control.   -Encourage breastfeeding.  -Incisional care and PO instructions reviewed.     Discharge planning    Discussed with MD Shantelle Whitman

## 2024-05-16 NOTE — PROGRESS NOTE ADULT - SUBJECTIVE AND OBJECTIVE BOX
Post-Operative Note, C/S  She is a  34y woman who is now post-operative day: 2    Subjective:  The patient feels well.  She is ambulating.   She is tolerating regular diet.  She denies nausea and vomiting; denies fever.  She is voiding.  Her pain is controlled; incisional pain is appropriate.  She reports normal postpartum bleeding.  She is breastfeeding.  She is formula feeding.    Physical exam:    Vital Signs Last 24 Hrs  T(C): 36.8 (16 May 2024 06:06), Max: 36.8 (15 May 2024 22:30)  T(F): 98.3 (16 May 2024 06:06), Max: 98.3 (16 May 2024 06:06)  HR: 77 (16 May 2024 06:06) (77 - 82)  BP: 105/68 (16 May 2024 06:06) (103/64 - 110/57)  BP(mean): --  RR: 18 (16 May 2024 06:06) (18 - 18)  SpO2: 97% (16 May 2024 06:06) (97% - 99%)        Gen: NAD  Breast: Soft, nontender, not engorged.  Abdomen: Soft, nontender, no distension , firm uterine fundus at umbilicus.  Incision: C/D/I.  Pelvic: Normal lochia noted  Ext: No calf tenderness    LABS:                        10.8   11.87 )-----------( 178      ( 15 May 2024 06:05 )             32.3       Rubella status:     Allergies    No Known Allergies    Intolerances      MEDICATIONS  (STANDING):  acetaminophen     Tablet .. 975 milliGRAM(s) Oral <User Schedule>  diphtheria/tetanus/pertussis (acellular) Vaccine (Adacel) 0.5 milliLiter(s) IntraMuscular once  ferrous    sulfate 325 milliGRAM(s) Oral daily  heparin   Injectable 33421 Unit(s) SubCutaneous every 12 hours  ibuprofen  Tablet. 600 milliGRAM(s) Oral every 6 hours  lactated ringers. 1000 milliLiter(s) (125 mL/Hr) IV Continuous <Continuous>  lactated ringers. 1000 milliLiter(s) (75 mL/Hr) IV Continuous <Continuous>  oxytocin Infusion 333.333 milliUNIT(s)/Min (1000 mL/Hr) IV Continuous <Continuous>    MEDICATIONS  (PRN):  diphenhydrAMINE 25 milliGRAM(s) Oral every 6 hours PRN Pruritus  lanolin Ointment 1 Application(s) Topical every 6 hours PRN Sore Nipples  magnesium hydroxide Suspension 30 milliLiter(s) Oral two times a day PRN Constipation  oxyCODONE    IR 5 milliGRAM(s) Oral once PRN Moderate to Severe Pain (4-10)  oxyCODONE    IR 5 milliGRAM(s) Oral every 3 hours PRN Moderate to Severe Pain (4-10)  simethicone 80 milliGRAM(s) Chew every 4 hours PRN Gas            
Post-Operative Note, C/S  She is a  34y woman who is now post-operative day: 1     Subjective:  The patient feels well.  She is ambulating.   She is tolerating regular diet.  She denies nausea and vomiting; denies fever.  She is voiding.  Her pain is controlled; incisional pain is appropriate.  She reports normal postpartum bleeding.  She is breastfeeding.  She is formula feeding.    Physical exam:    Vital Signs Last 24 Hrs  T(C): 36.7 (15 May 2024 10:08), Max: 36.9 (14 May 2024 21:30)  T(F): 98.1 (15 May 2024 10:08), Max: 98.4 (14 May 2024 21:30)  HR: 100 (15 May 2024 10:08) (62 - 100)  BP: 114/77 (15 May 2024 10:08) (91/62 - 118/84)  BP(mean): 98 (14 May 2024 15:00) (63 - 98)  RR: 18 (15 May 2024 10:08) (11 - 22)  SpO2: 99% (15 May 2024 10:08) (97% - 100%)    Parameters below as of 15 May 2024 06:34  Patient On (Oxygen Delivery Method): room air        Gen: NAD  Breast: Soft, nontender, not engorged.  Abdomen: Soft, nontender, no distension , firm uterine fundus at umbilicus.  Incision: C/D/I.  Pelvic: Normal lochia noted  Ext: No calf tenderness    LABS:                        10.8   11.87 )-----------( 178      ( 15 May 2024 06:05 )             32.3       Rubella status:     Allergies    No Known Allergies    Intolerances      MEDICATIONS  (STANDING):  acetaminophen     Tablet .. 975 milliGRAM(s) Oral <User Schedule>  diphtheria/tetanus/pertussis (acellular) Vaccine (Adacel) 0.5 milliLiter(s) IntraMuscular once  heparin   Injectable 78106 Unit(s) SubCutaneous every 12 hours  ibuprofen  Tablet. 600 milliGRAM(s) Oral every 6 hours  lactated ringers. 1000 milliLiter(s) (125 mL/Hr) IV Continuous <Continuous>  lactated ringers. 1000 milliLiter(s) (75 mL/Hr) IV Continuous <Continuous>  oxytocin Infusion 333.333 milliUNIT(s)/Min (1000 mL/Hr) IV Continuous <Continuous>    MEDICATIONS  (PRN):  diphenhydrAMINE 25 milliGRAM(s) Oral every 6 hours PRN Pruritus  lanolin Ointment 1 Application(s) Topical every 6 hours PRN Sore Nipples  magnesium hydroxide Suspension 30 milliLiter(s) Oral two times a day PRN Constipation  oxyCODONE    IR 5 milliGRAM(s) Oral every 3 hours PRN Moderate to Severe Pain (4-10)  oxyCODONE    IR 5 milliGRAM(s) Oral once PRN Moderate to Severe Pain (4-10)  simethicone 80 milliGRAM(s) Chew every 4 hours PRN Gas

## 2024-08-13 NOTE — OB PROVIDER H&P - PRO PRENATAL LABS ORI SOURCE HIV
hard copy, drawn during this pregnancy [Continuing, patient seen in-person within last 12 months] : Telehealth services are continuing as patient has been seen in-person within last 12 months. [Telehealth (audio & video) - Individual/Group] : This visit was provided via telehealth using real-time 2-way audio visual technology. [Other Location: e.g. Home (Enter Location, City,State)___] : The provider was located at [unfilled]. [Home] : The patient, [unfilled], was located at home, [unfilled], at the time of the visit. [Verbal consent obtained from patient/other participant(s)] : Verbal consent for telehealth/telephonic services obtained from patient/other participant(s) [FreeTextEntry4] : 3:00pm [FreeTextEntry5] : 3:45pm [FreeTextEntry2] : 7/15/24 [Patient] : Patient [FreeTextEntry1] : Anxiety and depression related to martial and family stressors

## 2025-06-30 ENCOUNTER — NON-APPOINTMENT (OUTPATIENT)
Age: 35
End: 2025-06-30

## (undated) DEVICE — DRSG DERMABOND PRINEO 22CM